# Patient Record
(demographics unavailable — no encounter records)

---

## 2019-06-01 NOTE — RAD
CHEST 2 VIEWS:

 

HISTORY: 

Chest pain.

 

COMPARISON: 

None.

 

FINDINGS: 

Normal cardiac silhouette. Lungs and pleural spaces are clear.  No pneumothorax or osseous abnormalit
ies.

 

IMPRESSION: 

No acute cardiopulmonary process.

 

POS: OFF